# Patient Record
Sex: MALE | Race: BLACK OR AFRICAN AMERICAN | Employment: FULL TIME | URBAN - METROPOLITAN AREA
[De-identification: names, ages, dates, MRNs, and addresses within clinical notes are randomized per-mention and may not be internally consistent; named-entity substitution may affect disease eponyms.]

---

## 2020-09-03 ENCOUNTER — OFFICE VISIT (OUTPATIENT)
Dept: CARDIOLOGY CLINIC | Age: 55
End: 2020-09-03

## 2020-09-03 VITALS
HEIGHT: 74 IN | BODY MASS INDEX: 32.6 KG/M2 | SYSTOLIC BLOOD PRESSURE: 149 MMHG | DIASTOLIC BLOOD PRESSURE: 93 MMHG | WEIGHT: 254 LBS | TEMPERATURE: 97.4 F | HEART RATE: 63 BPM

## 2020-09-03 DIAGNOSIS — Z86.79 S/P ABLATION OPERATION FOR ARRHYTHMIA: ICD-10-CM

## 2020-09-03 DIAGNOSIS — Z98.890 S/P ABLATION OPERATION FOR ARRHYTHMIA: ICD-10-CM

## 2020-09-03 DIAGNOSIS — I48.3 TYPICAL ATRIAL FLUTTER (HCC): Primary | ICD-10-CM

## 2020-09-03 DIAGNOSIS — R07.9 EXERTIONAL CHEST PAIN: ICD-10-CM

## 2020-09-03 DIAGNOSIS — I10 ESSENTIAL HYPERTENSION WITH GOAL BLOOD PRESSURE LESS THAN 130/85: ICD-10-CM

## 2020-09-03 RX ORDER — DILTIAZEM HYDROCHLORIDE 120 MG/1
120 CAPSULE, COATED, EXTENDED RELEASE ORAL DAILY
Qty: 90 CAP | Refills: 2 | Status: SHIPPED | OUTPATIENT
Start: 2020-09-03 | End: 2020-09-14

## 2020-09-03 RX ORDER — AMLODIPINE BESYLATE 5 MG/1
5 TABLET ORAL DAILY
COMMUNITY
End: 2020-09-03 | Stop reason: ALTCHOICE

## 2020-09-03 NOTE — PROGRESS NOTES
Have you been diagnosed with, tested for, or told that you are suspected of having COVID-19 (coronovirus)? N  Have you had a fever or taken medication to treat a fever in the past 72 hours? N  Have you had a cough, SOB, or flu-like symptoms within the past 3 days? N  Have you had direct contact with someone who tested positive for COVID-19 within the past 14 days? N  Do you have a household member with flu-like symptoms, including fever, cough, or SOB? N  Do you currently have flu-like symptoms including fever, cough, or SOB? NHave you been diagnosed with, tested for, or told that you are suspected of having COVID-19 (coronovirus)? N  Have you had a fever or taken medication to treat a fever in the past 72 hours? N  Have you had a cough, SOB, or flu-like symptoms within the past 3 days? N  Have you had direct contact with someone who tested positive for COVID-19 within the past 14 days? N  Do you have a household member with flu-like symptoms, including fever, cough, or SOB? N  Do you currently have flu-like symptoms including fever, cough, or SOB?    Nevin Momin

## 2020-09-03 NOTE — PROGRESS NOTES
1. Have you been to the ER, urgent care clinic since your last visit? Hospitalized since your last visit? [facility]    2. Have you seen or consulted any other health care providers outside of the [address] [place] [address] since your last visit? Include any pap smears or colon screening. Yes Where: Dr. Selvin Hampton     3. Since your last visit, have you had any of the following symptoms? chest pains and palpitations. 4.  Have you had any blood work, X-rays or cardiac testing? No     5. Where do you normally have your labs drawn?   6. Do you need any refills today?    no

## 2020-09-03 NOTE — PROGRESS NOTES
HISTORY OF PRESENT ILLNESS  Patrick Harry is a 53 y.o. male. 9/2020 Patient c/o exertional chest pain with palpitations. This has been happening over the last 2-3 weeks, episodes last 5-10 seconds and resolve. He stopped taking Cardizem and Eliquis years ago. He was seen by PCP on Monday and was given Rx for Amlodipine 5 mg, but has not started it yet. He denies SOB, edema, or syncopal episodes. Irregular Heart Beat    The history is provided by the patient and medical records. This is a recurrent problem. The current episode started more than 1 week ago. The problem has not changed since onset. The problem occurs every several days. The problem is associated with exercise. Associated symptoms include chest pain. Pertinent negatives include no malaise/fatigue, no claudication, no orthopnea, no PND, no nausea, no vomiting, no dizziness, no cough and no shortness of breath. Chest Pain (Angina)    Pertinent negatives include no claudication, no cough, no dizziness, no malaise/fatigue, no nausea, no orthopnea, no palpitations, no PND, no shortness of breath and no vomiting. Review of Systems   Constitutional: Negative for malaise/fatigue. HENT: Negative for congestion. Eyes: Negative for double vision and redness. Respiratory: Negative for cough, shortness of breath and wheezing. Cardiovascular: Positive for chest pain. Negative for palpitations, orthopnea, claudication, leg swelling and PND. Gastrointestinal: Negative for nausea and vomiting. Musculoskeletal: Negative for falls. Neurological: Negative for dizziness. Endo/Heme/Allergies: Does not bruise/bleed easily.      No Known Allergies    Past Medical History:   Diagnosis Date    Atrial fibrillation ([address] 3/9/2016    Essential hypertension with goal blood pressure less than 130/85 4/21/2016    Essential hypertension with goal blood pressure less than 130/85 4/21/2016    H/O rotator cuff tear     Hernia     Obesity (BMI 30.0-34.9) 3/9/2016       Family History   Problem Relation Age of Onset    Hypertension Mother     Heart Disease Mother     Hypertension Father     Heart Attack Neg Hx     Stroke Neg Hx     Heart Surgery Neg Hx        Social History     Tobacco Use    Smoking status: Never Smoker    Smokeless tobacco: Never Used   Substance Use Topics    Alcohol use: No    Drug use: No        Current Outpatient Medications   Medication Sig    dilTIAZem ER (CARDIZEM CD) 120 mg capsule Take 1 Cap by mouth daily. No current facility-administered medications for this visit. Past Surgical History:   Procedure Laterality Date    HX HERNIA REPAIR      HX ORTHOPAEDIC      rotator cuff       Visit Vitals  BP (!) 149/93   Pulse 63   Temp 97.4 °F (36.3 °C) (Temporal)   Ht 6' 2\" (1.88 m)   Wt 115.2 kg (254 lb)   BMI 32.61 kg/m²       Diagnostic Studies:  I have reviewed the relevant tests done on the patient and show as follows  EKG tracings reviewed by me today. CARDIOLOGY STUDIES 3/17/2016 3/8/2016 3/6/2015   EKG Result - AFib, rvr, NSTT -   Stress Echo Result - - negative   Echocardiogram - Complete Result 55%EF, tr MR - -   24 hr Holter Monitor Result SR, PAF(18.7%), PACs, PVCs - -   Some recent data might be hidden       Mr. Jojo Ruiz has a reminder for a \"due or due soon\" health maintenance. I have asked that he contact his primary care provider for follow-up on this health maintenance. Physical Exam   Constitutional: He is oriented to person, place, and time. He appears well-developed and well-nourished. Neck: Normal range of motion. Neck supple. No JVD present. Cardiovascular: Normal rate, regular rhythm, normal heart sounds and intact distal pulses. Exam reveals no gallop and no friction rub. No murmur heard. Pulmonary/Chest: Effort normal and breath sounds normal. No respiratory distress. He has no wheezes. He has no rales. He exhibits no tenderness. Musculoskeletal: Normal range of motion. General: No edema. Neurological: He is alert and oriented to person, place, and time. Skin: Skin is warm and dry. Nursing note and vitals reviewed. Atrial Fibrillation CHADSVASC2 Score Stroke Risk:   53 y.o. <65        + 0    male Male     [de-identified]   CHF HX: No    + 0   HTN HX: Yes    +1   Stroke/TIA/Thromboembolism No    +0   Vascular Disease HX: No    + 0   Diabetes Mellitus No    + 0   CHADSVASC 2 Score 1      Annual Stroke Risk 0.6% - low-moderate risk        ASSESSMENT and PLAN    9/2020 - EKG SR  Patient was last seen in 2016. He has h/o PAF s/p cryo ablation. He stopped taking all medications several years ago, because he felt they were no longer needed. He c/o chest pain with palpitations that began 2-3 weeks ago. Episodes occur with exertion, last 5-10 seconds and resolve with rest. He denies SOB, edema or syncope. Will begin Diltiazem 120 mg. Advised to not start Amlodipine. Follow home b/p. Event monitor x 30 days to evaluate for PAF. Lili Orosco is 1 for HTN. Will monitor. Diagnoses and all orders for this visit:    1. Typical atrial flutter (HCC)  -     AMB POC EKG ROUTINE W/ 12 LEADS, INTER & REP  -     CARDIAC EVENT MONITOR; Future  -     dilTIAZem ER (CARDIZEM CD) 120 mg capsule; Take 1 Cap by mouth daily.  -     ECHO ADULT COMPLETE; Future    2. Exertional chest pain    3. Essential hypertension with goal blood pressure less than 130/85    4. S/P ablation operation for arrhythmia  Comments:   A.fib ablation with cryo 10/4/16        Pertinent laboratory and test data reviewed and discussed with patient.   See patient instructions also for other medical advice given    Medications Discontinued During This Encounter   Medication Reason    apixaban (ELIQUIS) 5 mg tablet Non-Compliance    diltiazem CD (CARDIZEM CD) 120 mg ER capsule Non-Compliance    pantoprazole (PROTONIX) 40 mg tablet Non-Compliance    amLODIPine (NORVASC) 5 mg tablet Discontinued by Another Clinician       Follow-up and Dispositions    · Return in about 6 weeks (around 10/15/2020). I have independently evaluated and examined the patient. Seen after almost 4 years. Will check an event monitor to evaluate atrial fibrillation. Restart Cardizem that was controlling his blood pressure in the past and follow the home chart. Check an echo. Discussed with patient and stressed the importance of blood pressure control. This will reduce the risk of cardiovascular disease as well as kidney disease in future. He seems to understand. All relevant labs and testing data are reviewed. Care plan discussed and updated after review.   Nika John MD

## 2020-09-03 NOTE — PATIENT INSTRUCTIONS
Do not start Amlodipine Begin Cardizem  mg/ day Event Monitor - f/u after finished. After the recommended changes have been made in blood pressure medicines, patient advised to keep BP/HR(pulse rate) chart twice daily and bring us results in next 1 week or so. Patient may send the results via \"My Chart\" if desired. Please rest for 5-10 minutes before checking blood pressure. Sit on a comfortable chair without crossing the legs and put your arm on a table. We recommend that you use an upper arm cuff. Check the blood pressure 3 times weekly and take the lowest reading. If you check the blood pressure in both arms, use the higher reading. Heart-Healthy Diet: Care Instructions Your Care Instructions A heart-healthy diet has lots of vegetables, fruits, nuts, beans, and whole grains, and is low in salt. It limits foods that are high in saturated fat, such as meats, cheeses, and fried foods. It may be hard to change your diet, but even small changes can lower your risk of heart attack and heart disease. Follow-up care is a key part of your treatment and safety. Be sure to make and go to all appointments, and call your doctor if you are having problems. It's also a good idea to know your test results and keep a list of the medicines you take. How can you care for yourself at home? Watch your portions · Learn what a serving is. A \"serving\" and a \"portion\" are not always the same thing. Make sure that you are not eating larger portions than are recommended. For example, a serving of pasta is ½ cup. A serving size of meat is 2 to 3 ounces. A 3-ounce serving is about the size of a deck of cards. Measure serving sizes until you are good at [place]" them. Keep in mind that restaurants often serve portions that are 2 or 3 times the size of one serving. · To keep your energy level up and keep you from feeling hungry, eat often but in smaller portions. · Eat only the number of calories you need to stay at a healthy weight. If you need to lose weight, eat fewer calories than your body burns (through exercise and other physical activity). Eat more fruits and vegetables · Eat a variety of fruit and vegetables every day. Dark green, deep orange, red, or yellow fruits and vegetables are especially good for you. Examples include spinach, carrots, peaches, and berries. · Keep carrots, celery, and other veggies handy for snacks. Buy fruit that is in season and store it where you can see it so that you will be tempted to eat it. · Cook dishes that have a lot of veggies in them, such as stir-fries and soups. Limit saturated and trans fat · Read food labels, and try to avoid saturated and trans fats. They increase your risk of heart disease. · Use olive or canola oil when you cook. · Bake, broil, grill, or steam foods instead of frying them. · Choose lean meats instead of high-fat meats such as hot dogs and sausages. Cut off all visible fat when you prepare meat. · Eat fish, skinless poultry, and meat alternatives such as soy products instead of high-fat meats. Soy products, such as tofu, may be especially good for your heart. · Choose low-fat or fat-free milk and dairy products. Eat foods high in fiber · Eat a variety of grain products every day. Include whole-grain foods that have lots of fiber and nutrients. Examples of whole-grain foods include oats, whole wheat bread, and brown rice. · Buy whole-grain breads and cereals, instead of white bread or pastries. Limit salt and sodium · Limit how much salt and sodium you eat to help lower your blood pressure. · Taste food before you salt it. Add only a little salt when you think you need it. With time, your taste buds will adjust to less salt. · Eat fewer snack items, fast foods, and other high-salt, processed foods. Check food labels for the amount of sodium in packaged foods. · Choose low-sodium versions of canned goods (such as soups, vegetables, and beans). Limit sugar · Limit drinks and foods with added sugar. These include candy, desserts, and soda pop. Limit alcohol · Limit alcohol to no more than 2 drinks a day for men and 1 drink a day for women. Too much alcohol can cause health problems. When should you call for help? Watch closely for changes in your health, and be sure to contact your doctor if: 
· You would like help planning heart-healthy meals. Where can you learn more? Go to http://www.viera.com/ Enter V137 in the search box to learn more about \"Heart-Healthy Diet: Care Instructions. \" Current as of: August 22, 2019               Content Version: 12.5 © 8761-2408 [employer]. Care instructions adapted under license by [employer] (which disclaims liability or warranty for this information). If you have questions about a medical condition or this instruction, always ask your healthcare professional. [address] any warranty or liability for your use of this information. Link Trigger Activation Thank you for requesting access to Link Trigger. Please follow the instructions below to securely access and download your online medical record. Link Trigger allows you to send messages to your doctor, view your test results, renew your prescriptions, schedule appointments, and more. How Do I Sign Up? 1. In your internet browser, go to [email or web address]. 2. Click on the First Time User? Click Here link in the Sign In box. You will see the New Member Sign Up page. 3. Enter your Link Trigger Access Code exactly as it appears below. You will not need to use this code after youve completed the sign-up process. If you do not sign up before the expiration date, you must request a new code.  
 
Link Trigger Access Code: [email or web address] 
 Expires: 10/18/2020  9:16 AM (This is the date your Altobeam access code will ) 4. Enter the last four digits of your Social Security Number (xxxx) and Date of Birth (mm/dd/yyyy) as indicated and click Submit. You will be taken to the next sign-up page. 5. Create a Altobeam ID. This will be your Altobeam login ID and cannot be changed, so think of one that is secure and easy to remember. 6. Create a Altobeam password. You can change your password at any time. 7. Enter your Password Reset Question and Answer. This can be used at a later time if you forget your password. 8. Enter your e-mail address. You will receive e-mail notification when new information is available in [address] 9. Click Sign Up. You can now view and download portions of your medical record. 10. Click the Download Summary menu link to download a portable copy of your medical information. Additional Information If you have questions, please visit the Frequently Asked Questions section of the Altobeam website at [email or web address] Remember, Altobeam is NOT to be used for urgent needs. For medical emergencies, dial 911.

## 2020-09-10 ENCOUNTER — TELEPHONE (OUTPATIENT)
Dept: CARDIOLOGY CLINIC | Age: 55
End: 2020-09-10

## 2020-09-10 NOTE — LETTER
NOTIFICATION RETURN TO WORK / SCHOOL 
 
9/10/2020 1:01 PM 
 
 
To: Kassidy Campos Reference to:  Trip Hickey , : 1965 Trip Hickey is currently under the care of [facility]. Patient has a monitor and this device comes with the monitor that looks like a foreign but it cannot be used as a cell phone. Patient needs to wear the monitor to diagnose his heart issues. If there are questions or concerns please have the patient contact our office at [phone]. Sincerely, Signed By: Marifer Roy MD  
 September 10, 2020 Dr. Marifer Iniguez.  Ely Roy MD, [facility]

## 2020-09-10 NOTE — TELEPHONE ENCOUNTER
Patient stated he is wearing a monitor and he works at [employer] and they are not allowed to have phones in the building. He stated he need a note stated he has to wear the monitor and he need the phone device.  Please advise

## 2020-09-10 NOTE — TELEPHONE ENCOUNTER
The device that comes with the monitor looks like a foreign but it cannot be used as a cell phone. Patient needs to wear the monitor to diagnose his heart issues.     Please give him the above letter

## 2020-09-14 ENCOUNTER — OFFICE VISIT (OUTPATIENT)
Dept: CARDIOLOGY CLINIC | Age: 55
End: 2020-09-14

## 2020-09-14 VITALS
DIASTOLIC BLOOD PRESSURE: 104 MMHG | HEART RATE: 74 BPM | TEMPERATURE: 97.4 F | WEIGHT: 249 LBS | SYSTOLIC BLOOD PRESSURE: 156 MMHG | BODY MASS INDEX: 31.95 KG/M2 | HEIGHT: 74 IN

## 2020-09-14 DIAGNOSIS — I10 ESSENTIAL HYPERTENSION WITH GOAL BLOOD PRESSURE LESS THAN 130/85: Primary | ICD-10-CM

## 2020-09-14 DIAGNOSIS — R42 DIZZINESS: ICD-10-CM

## 2020-09-14 DIAGNOSIS — Z86.79 S/P ABLATION OPERATION FOR ARRHYTHMIA: ICD-10-CM

## 2020-09-14 DIAGNOSIS — E66.9 OBESITY (BMI 30.0-34.9): ICD-10-CM

## 2020-09-14 DIAGNOSIS — Z98.890 S/P ABLATION OPERATION FOR ARRHYTHMIA: ICD-10-CM

## 2020-09-14 DIAGNOSIS — I48.3 TYPICAL ATRIAL FLUTTER (HCC): ICD-10-CM

## 2020-09-14 RX ORDER — LISINOPRIL 10 MG/1
10 TABLET ORAL DAILY
Qty: 30 TAB | Refills: 1 | Status: SHIPPED | OUTPATIENT
Start: 2020-09-14 | End: 2020-09-28

## 2020-09-14 RX ORDER — DILTIAZEM HYDROCHLORIDE 240 MG/1
240 CAPSULE, COATED, EXTENDED RELEASE ORAL DAILY
Qty: 90 CAP | Refills: 1 | Status: SHIPPED | OUTPATIENT
Start: 2020-09-14

## 2020-09-14 RX ORDER — MECLIZINE HYDROCHLORIDE 25 MG/1
25 TABLET ORAL
Qty: 30 TAB | Refills: 0 | Status: SHIPPED | OUTPATIENT
Start: 2020-09-14 | End: 2020-09-24

## 2020-09-14 NOTE — LETTER
NOTIFICATION OF RETURN TO WORK / SCHOOL 
 
9/14/2020 8:34 AM 
 
Mr. Liz Diehl [address] [facility] [id number] [place] To Whom It May Concern: 
 
Liz Diehl was under the care of [address] from 9/14/2020 to 9/14/2020. He will be able to return to work on 9/29/2020 with no restrictions. If there are questions or concerns please have the patient contact our office. Sincerely, Signed By: Roya Delcid MD  
 September 14, 2020 Hemant Thompson MD

## 2020-09-14 NOTE — PATIENT INSTRUCTIONS
Medications Discontinued During This Encounter Medication Reason  dilTIAZem ER (CARDIZEM CD) 120 mg capsule After the recommended changes have been made in blood pressure medicines, patient advised to keep BP/HR(pulse rate) chart twice daily and bring us results in next 2-3 days. Patient may send the results via \"My Chart\" if desired. Please rest for 5-10 minutes before checking blood pressure. Sit on a comfortable chair without crossing the legs and put your arm on a table. We recommend that you use an upper arm cuff. Check the blood pressure 3 times weekly and take the lowest reading. If you check the blood pressure in both arms, use the higher reading. Because of severely elevated blood pressure patient should be excused from work for 2 weeks till his next appointment. High Blood Pressure: Care Instructions Overview It's normal for blood pressure to go up and down throughout the day. But if it stays up, you have high blood pressure. Another name for high blood pressure is hypertension. Despite what a lot of people think, high blood pressure usually doesn't cause headaches or make you feel dizzy or lightheaded. It usually has no symptoms. But it does increase your risk of stroke, heart attack, and other problems. You and your doctor will talk about your risks of these problems based on your blood pressure. Your doctor will give you a goal for your blood pressure. Your goal will be based on your health and your age. Lifestyle changes, such as eating healthy and being active, are always important to help lower blood pressure. You might also take medicine to reach your blood pressure goal. 
Follow-up care is a key part of your treatment and safety. Be sure to make and go to all appointments, and call your doctor if you are having problems. It's also a good idea to know your test results and keep a list of the medicines you take. How can you care for yourself at home? Medical treatment · If you stop taking your medicine, your blood pressure will go back up. You may take one or more types of medicine to lower your blood pressure. Be safe with medicines. Take your medicine exactly as prescribed. Call your doctor if you think you are having a problem with your medicine. · Talk to your doctor before you start taking aspirin every day. Aspirin can help certain people lower their risk of a heart attack or stroke. But taking aspirin isn't right for everyone, because it can cause serious bleeding. · See your doctor regularly. You may need to see the doctor more often at first or until your blood pressure comes down. · If you are taking blood pressure medicine, talk to your doctor before you take decongestants or anti-inflammatory medicine, such as ibuprofen. Some of these medicines can raise blood pressure. · Learn how to check your blood pressure at home. Lifestyle changes · Stay at a healthy weight. This is especially important if you put on weight around the waist. Losing even 10 pounds can help you lower your blood pressure. · If your doctor recommends it, get more exercise. Walking is a good choice. Bit by bit, increase the amount you walk every day. Try for at least 30 minutes on most days of the week. You also may want to swim, bike, or do other activities. · Avoid or limit alcohol. Talk to your doctor about whether you can drink any alcohol. · Try to limit how much sodium you eat to less than 2,300 milligrams (mg) a day. Your doctor may ask you to try to eat less than 1,500 mg a day. · Eat plenty of fruits (such as bananas and oranges), vegetables, legumes, whole grains, and low-fat dairy products. · Lower the amount of saturated fat in your diet. Saturated fat is found in animal products such as milk, cheese, and meat. Limiting these foods may help you lose weight and also lower your risk for heart disease. · Do not smoke. Smoking increases your risk for heart attack and stroke. If you need help quitting, talk to your doctor about stop-smoking programs and medicines. These can increase your chances of quitting for good. When should you call for help? Call  911 anytime you think you may need emergency care. This may mean having symptoms that suggest that your blood pressure is causing a serious heart or blood vessel problem. Your blood pressure may be over 180/120. For example, call 911 if: 
  · You have symptoms of a heart attack. These may include: 
? Chest pain or pressure, or a strange feeling in the chest. 
? Sweating. ? Shortness of breath. ? Nausea or vomiting. ? Pain, pressure, or a strange feeling in the back, neck, jaw, or upper belly or in one or both shoulders or arms. ? Lightheadedness or sudden weakness. ? A fast or irregular heartbeat.  
  · You have symptoms of a stroke. These may include: 
? Sudden numbness, tingling, weakness, or loss of movement in your face, arm, or leg, especially on only one side of your body. ? Sudden vision changes. ? Sudden trouble speaking. ? Sudden confusion or trouble understanding simple statements. ? Sudden problems with walking or balance. ? A sudden, severe headache that is different from past headaches.  
  · You have severe back or belly pain. Do not wait until your blood pressure comes down on its own. Get help right away. Call your doctor now or seek immediate care if: 
  · Your blood pressure is much higher than normal (such as 180/120 or higher), but you don't have symptoms.  
  · You think high blood pressure is causing symptoms, such as: 
? Severe headache. 
? Blurry vision. Watch closely for changes in your health, and be sure to contact your doctor if: 
  · Your blood pressure measures higher than your doctor recommends at least 2 times. That means the top number is higher or the bottom number is higher, or both.  
  · You think you may be having side effects from your blood pressure medicine. Where can you learn more? Go to http://www.gray.com/ Enter D856 in the search box to learn more about \"High Blood Pressure: Care Instructions. \" Current as of: 2019               Content Version: 12.6 © 5681-7163 Healthwise, Incorporated. Care instructions adapted under license by [employer] (which disclaims liability or warranty for this information). If you have questions about a medical condition or this instruction, always ask your healthcare professional. [facility] [address] any warranty or liability for your use of this information. Brandlive Activation Thank you for requesting access to Brandlive. Please follow the instructions below to securely access and download your online medical record. Brandlive allows you to send messages to your doctor, view your test results, renew your prescriptions, schedule appointments, and more. How Do I Sign Up? 1. In your internet browser, go to [email or web address] [email or web address]. 2. Click on the First Time User? Click Here link in the Sign In box. You will see the New Member Sign Up page. 3. Enter your Brandlive Access Code exactly as it appears below. You will not need to use this code after youve completed the sign-up process. If you do not sign up before the expiration date, you must request a new code. Brandlive Access Code: [email or web address] Expires: 10/29/2020  8:04 AM (This is the date your Brandlive access code will ) 4. Enter the last four digits of your Social Security Number (xxxx) and Date of Birth (mm/dd/yyyy) as indicated and click Submit. You will be taken to the next sign-up page. 5. Create a Brandlive ID. This will be your Brandlive login ID and cannot be changed, so think of one that is secure and easy to remember. 6. Create a Brandlive password. You can change your password at any time. 7. Enter your Password Reset Question and Answer.  This can be used at a later time if you forget your password. 8. Enter your e-mail address. You will receive e-mail notification when new information is available in [address] 9. Click Sign Up. You can now view and download portions of your medical record. 10. Click the Download Summary menu link to download a portable copy of your medical information. Additional Information If you have questions, please visit the Frequently Asked Questions section of the ZenMate website at [email or web address] Remember, [employer]Mate is NOT to be used for urgent needs. For medical emergencies, dial 911.

## 2020-09-14 NOTE — PROGRESS NOTES
HISTORY OF PRESENT ILLNESS  Maxwell Montiel is a 53 y.o. male. 9/2020 Patient c/o exertional chest pain with palpitations. This has been happening over the last 2-3 weeks, episodes last 5-10 seconds and resolve. He stopped taking Cardizem and Eliquis years ago. He was seen by PCP on Monday and was given Rx for Amlodipine 5 mg, but has not started it yet. He denies SOB, edema, or syncopal episodes. Palpitations    The history is provided by the patient and medical records. This is a recurrent problem. The current episode started more than 1 week ago. The problem has not changed since onset. The problem occurs every several days. The problem is associated with exercise. Associated symptoms include chest pain, dizziness and shortness of breath. Pertinent negatives include no fever, no malaise/fatigue, no claudication, no orthopnea, no PND, no nausea, no vomiting, no headaches and no cough. Chest Pain (Angina)    The history is provided by the patient. This is a new problem. The current episode started more than 2 days ago. The problem occurs daily (4-5/ay). The pain is associated with rest and normal activity. The pain is present in the lateral region and right side. The quality of the pain is described as stabbing. Associated symptoms include dizziness, palpitations and shortness of breath. Pertinent negatives include no claudication, no cough, no fever, no headaches, no malaise/fatigue, no nausea, no orthopnea, no PND and no vomiting. He has tried nothing for the symptoms. Dizziness   The history is provided by the patient. This is a new problem. The current episode started more than 1 week ago. The problem occurs daily. Associated symptoms include chest pain and shortness of breath. Pertinent negatives include no headaches. The symptoms are aggravated by standing and twisting. The symptoms are relieved by sleep.        Review of Systems   Constitutional: Negative for chills, fever, malaise/fatigue and weight loss. HENT: Negative for congestion and nosebleeds. Eyes: Negative for double vision, discharge and redness. Respiratory: Positive for shortness of breath. Negative for cough and wheezing. Cardiovascular: Positive for chest pain and palpitations. Negative for orthopnea, claudication, leg swelling and PND. Gastrointestinal: Negative for diarrhea, nausea and vomiting. Genitourinary: Negative for dysuria and hematuria. Musculoskeletal: Negative for falls and joint pain. Skin: Negative for rash. Neurological: Positive for dizziness. Negative for seizures, loss of consciousness and headaches. Endo/Heme/Allergies: Negative for polydipsia. Does not bruise/bleed easily. Psychiatric/Behavioral: Negative for depression and substance abuse. The patient does not have insomnia. No Known Allergies    Past Medical History:   Diagnosis Date    Atrial fibrillation ([facility] [address] 3/9/2016    Essential hypertension with goal blood pressure less than 130/85 4/21/2016    Essential hypertension with goal blood pressure less than 130/85 4/21/2016    H/O rotator cuff tear     Hernia     Obesity (BMI 30.0-34.9) 3/9/2016       Family History   Problem Relation Age of Onset    Hypertension Mother     Heart Disease Mother     Hypertension Father     Heart Attack Neg Hx     Stroke Neg Hx     Heart Surgery Neg Hx        Social History     Tobacco Use    Smoking status: Never Smoker    Smokeless tobacco: Never Used   Substance Use Topics    Alcohol use: No    Drug use: No        Current Outpatient Medications   Medication Sig    dilTIAZem ER (CARDIZEM CD) 120 mg capsule Take 1 Cap by mouth daily. No current facility-administered medications for this visit.          Past Surgical History:   Procedure Laterality Date    HX HERNIA REPAIR      HX ORTHOPAEDIC      rotator cuff       Visit Vitals  BP (!) 156/104 (BP 1 Location: Left arm, BP Patient Position: Standing)   Pulse 74   Temp 97.4 °F (36.3 °C) (Temporal)   Ht 6' 2\" (1.88 m)   Wt 112.9 kg (249 lb)   BMI 31.97 kg/m²     Vitals:    09/14/20 0807 09/14/20 0812 09/14/20 0813   BP: (!) 166/91 (!) 168/89 (!) 156/104   BP 1 Location: Left arm Left arm Left arm   BP Patient Position: Sitting Supine Standing   Pulse: 66 68 74   Temp: 97.4 °F (36.3 °C)     TempSrc: Temporal     Weight: 112.9 kg (249 lb)     Height: 6' 2\" (1.88 m)           Diagnostic Studies:  I have reviewed the relevant tests done on the patient and show as follows  EKG tracings reviewed by me today. CARDIOLOGY STUDIES 3/17/2016 3/8/2016 3/6/2015   EKG Result - AFib, rvr, NSTT -   Stress Echo Result - - negative   Echocardiogram - Complete Result 55%EF, tr MR - -   24 hr Holter Monitor Result SR, PAF(18.7%), PACs, PVCs - -   Some recent data might be hidden       Mr. Lyssa Flores has a reminder for a \"due or due soon\" health maintenance. I have asked that he contact his primary care provider for follow-up on this health maintenance. Physical Exam   Constitutional: He is oriented to person, place, and time. He appears well-developed and well-nourished. Neck: Normal range of motion. Neck supple. No JVD present. Cardiovascular: Normal rate, regular rhythm, normal heart sounds and intact distal pulses. Exam reveals no gallop and no friction rub. No murmur heard. Pulmonary/Chest: Effort normal and breath sounds normal. No respiratory distress. He has no wheezes. He has no rales. He exhibits no tenderness. Musculoskeletal: Normal range of motion. General: No edema. Neurological: He is alert and oriented to person, place, and time. Skin: Skin is warm and dry. Nursing note and vitals reviewed.     Atrial Fibrillation CHADSVASC2 Score Stroke Risk:   53 y.o. <65        + 0    male Male     [de-identified]   CHF HX: No    + 0   HTN HX: Yes    +1   Stroke/TIA/Thromboembolism No    +0   Vascular Disease HX: No    + 0   Diabetes Mellitus No    + 0   CHADSVASC 2 Score 1      Annual Stroke Risk 0.6% - low-moderate risk        ASSESSMENT and PLAN    9/2020 - EKG SR  Patient was last seen in 2016. He has h/o PAF s/p cryo ablation. He stopped taking all medications several years ago, because he felt they were no longer needed. He c/o chest pain with palpitations that began 2-3 weeks ago. Episodes occur with exertion, last 5-10 seconds and resolve with rest. He denies SOB, edema or syncope. Will begin Diltiazem 120 mg. Advised to not start Amlodipine. Follow home b/p. Event monitor x 30 days to evaluate for PAF. Michael Vivass is 1 for HTN. Will monitor. 9/20 seen again due to dizziness. Also complaining of chest discomfort and shortness of breath. Symptoms are very atypical and likely secondary to uncontrolled hypertension. No orthostatic changes. Increase diltiazem and add lisinopril. Follow home chart. Will consider a stress test as blood pressure is controlled. No orthostatic changes and event monitor has not shown any significant arrhythmias so far. Excuse from work was given for 2 weeks due to uncontrolled hypertension. Diagnoses and all orders for this visit:    1. Essential hypertension with goal blood pressure less than 130/85  -     lisinopriL (PRINIVIL, ZESTRIL) 10 mg tablet; Take 1 Tab by mouth daily.  -     METABOLIC PANEL, BASIC; Future    2. Typical atrial flutter (HCC)  -     dilTIAZem ER (CARDIZEM CD) 240 mg capsule; Take 1 Cap by mouth daily. 3. S/P ablation operation for arrhythmia    4. Obesity (BMI 30.0-34.9)    5. Dizziness  -     meclizine (ANTIVERT) 25 mg tablet; Take 1 Tab by mouth two (2) times daily as needed for Dizziness for up to 10 days. Pertinent laboratory and test data reviewed and discussed with patient.   See patient instructions also for other medical advice given    Medications Discontinued During This Encounter   Medication Reason    dilTIAZem ER (CARDIZEM CD) 120 mg capsule        Follow-up and Dispositions    · Return in about 2 weeks (around 9/28/2020), or if symptoms worsen or fail to improve, for post test.

## 2020-09-14 NOTE — PROGRESS NOTES
1. Have you been to the ER, urgent care clinic since your last visit? Hospitalized since your last visit?     no  2. Have you seen or consulted any other health care providers outside of the [address] [place] [address] since your last visit? Include any pap smears or colon screening. No     3. Since your last visit, have you had any of the following symptoms? chest pains, shortness of breath and dizziness. 4.  Have you had any blood work, X-rays or cardiac testing? No         5. Where do you normally have your labs drawn? 6. Do you need any refills today?    no

## 2020-09-16 ENCOUNTER — TELEPHONE (OUTPATIENT)
Dept: CARDIOLOGY CLINIC | Age: 55
End: 2020-09-16

## 2020-09-16 DIAGNOSIS — I48.3 TYPICAL ATRIAL FLUTTER (HCC): ICD-10-CM

## 2020-09-16 NOTE — TELEPHONE ENCOUNTER
Per Dr Hay Grimes after reviewing Event done on 9/9/20:    Buck Graham states that patient fainted  SR w/ PAC's/ PVC's  Call and check on patient. Left message for patient to call the office.

## 2020-09-16 NOTE — TELEPHONE ENCOUNTER
Called and spoke with patient. Patient states that he didn't faint that he felt a little dizzy after standing and was a little off balance.  CARL

## 2020-09-26 LAB
ANION GAP SERPL CALC-SCNC: 12.3 MMOL/L (ref 3–15)
BUN SERPL-MCNC: 11 MG/DL (ref 6–22)
CALCIUM SERPL-MCNC: 9 MG/DL (ref 8.4–10.5)
CHLORIDE SERPL-SCNC: 105 MMOL/L (ref 98–110)
CO2 SERPL-SCNC: 28 MMOL/L (ref 20–32)
CREAT SERPL-MCNC: 0.9 MG/DL (ref 0.5–1.2)
GFRAA, 66117: >60
GFRNA, 66118: >60
GLUCOSE SERPL-MCNC: 96 MG/DL (ref 70–99)
POTASSIUM SERPL-SCNC: 4.5 MMOL/L (ref 3.5–5.5)
SODIUM SERPL-SCNC: 145 MMOL/L (ref 133–145)

## 2020-09-28 ENCOUNTER — OFFICE VISIT (OUTPATIENT)
Dept: CARDIOLOGY CLINIC | Age: 55
End: 2020-09-28
Payer: COMMERCIAL

## 2020-09-28 VITALS
WEIGHT: 252 LBS | HEART RATE: 63 BPM | SYSTOLIC BLOOD PRESSURE: 168 MMHG | BODY MASS INDEX: 32.34 KG/M2 | HEIGHT: 74 IN | TEMPERATURE: 97.7 F | DIASTOLIC BLOOD PRESSURE: 86 MMHG

## 2020-09-28 DIAGNOSIS — E66.9 OBESITY (BMI 30.0-34.9): ICD-10-CM

## 2020-09-28 DIAGNOSIS — Z98.890 S/P ABLATION OPERATION FOR ARRHYTHMIA: ICD-10-CM

## 2020-09-28 DIAGNOSIS — R42 DIZZINESS: ICD-10-CM

## 2020-09-28 DIAGNOSIS — Z86.79 S/P ABLATION OPERATION FOR ARRHYTHMIA: ICD-10-CM

## 2020-09-28 DIAGNOSIS — I10 ESSENTIAL HYPERTENSION WITH GOAL BLOOD PRESSURE LESS THAN 130/85: Primary | ICD-10-CM

## 2020-09-28 PROCEDURE — 99213 OFFICE O/P EST LOW 20 MIN: CPT | Performed by: INTERNAL MEDICINE

## 2020-09-28 RX ORDER — LISINOPRIL 20 MG/1
20 TABLET ORAL DAILY
Qty: 90 TAB | Refills: 1 | Status: SHIPPED | OUTPATIENT
Start: 2020-09-28

## 2020-09-28 NOTE — PATIENT INSTRUCTIONS
Medications Discontinued During This Encounter Medication Reason  lisinopriL (PRINIVIL, ZESTRIL) 10 mg tablet After the recommended changes have been made in blood pressure medicines, patient advised to keep BP/HR(pulse rate) chart twice daily and bring us results in next 3 days. Patient may send the results via \"My Chart\" if desired. Please rest for 5-10 minutes before checking blood pressure. Sit on a comfortable chair without crossing the legs and put your arm on a table. We recommend that you use an upper arm cuff. Check the blood pressure 3 times each time you check the blood pressure and record the lowest reading. If you check the blood pressure in both arms, use the higher reading. High Blood Pressure: Care Instructions Overview It's normal for blood pressure to go up and down throughout the day. But if it stays up, you have high blood pressure. Another name for high blood pressure is hypertension. Despite what a lot of people think, high blood pressure usually doesn't cause headaches or make you feel dizzy or lightheaded. It usually has no symptoms. But it does increase your risk of stroke, heart attack, and other problems. You and your doctor will talk about your risks of these problems based on your blood pressure. Your doctor will give you a goal for your blood pressure. Your goal will be based on your health and your age. Lifestyle changes, such as eating healthy and being active, are always important to help lower blood pressure. You might also take medicine to reach your blood pressure goal. 
Follow-up care is a key part of your treatment and safety. Be sure to make and go to all appointments, and call your doctor if you are having problems. It's also a good idea to know your test results and keep a list of the medicines you take. How can you care for yourself at home? Medical treatment · If you stop taking your medicine, your blood pressure will go back up. You may take one or more types of medicine to lower your blood pressure. Be safe with medicines. Take your medicine exactly as prescribed. Call your doctor if you think you are having a problem with your medicine. · Talk to your doctor before you start taking aspirin every day. Aspirin can help certain people lower their risk of a heart attack or stroke. But taking aspirin isn't right for everyone, because it can cause serious bleeding. · See your doctor regularly. You may need to see the doctor more often at first or until your blood pressure comes down. · If you are taking blood pressure medicine, talk to your doctor before you take decongestants or anti-inflammatory medicine, such as ibuprofen. Some of these medicines can raise blood pressure. · Learn how to check your blood pressure at home. Lifestyle changes · Stay at a healthy weight. This is especially important if you put on weight around the waist. Losing even 10 pounds can help you lower your blood pressure. · If your doctor recommends it, get more exercise. Walking is a good choice. Bit by bit, increase the amount you walk every day. Try for at least 30 minutes on most days of the week. You also may want to swim, bike, or do other activities. · Avoid or limit alcohol. Talk to your doctor about whether you can drink any alcohol. · Try to limit how much sodium you eat to less than 2,300 milligrams (mg) a day. Your doctor may ask you to try to eat less than 1,500 mg a day. · Eat plenty of fruits (such as bananas and oranges), vegetables, legumes, whole grains, and low-fat dairy products. · Lower the amount of saturated fat in your diet. Saturated fat is found in animal products such as milk, cheese, and meat. Limiting these foods may help you lose weight and also lower your risk for heart disease. · Do not smoke. Smoking increases your risk for heart attack and stroke.  If you need help quitting, talk to your doctor about stop-smoking programs and medicines. These can increase your chances of quitting for good. When should you call for help? Call  911 anytime you think you may need emergency care. This may mean having symptoms that suggest that your blood pressure is causing a serious heart or blood vessel problem. Your blood pressure may be over 180/120. For example, call 911 if: 
  · You have symptoms of a heart attack. These may include: 
? Chest pain or pressure, or a strange feeling in the chest. 
? Sweating. ? Shortness of breath. ? Nausea or vomiting. ? Pain, pressure, or a strange feeling in the back, neck, jaw, or upper belly or in one or both shoulders or arms. ? Lightheadedness or sudden weakness. ? A fast or irregular heartbeat.  
  · You have symptoms of a stroke. These may include: 
? Sudden numbness, tingling, weakness, or loss of movement in your face, arm, or leg, especially on only one side of your body. ? Sudden vision changes. ? Sudden trouble speaking. ? Sudden confusion or trouble understanding simple statements. ? Sudden problems with walking or balance. ? A sudden, severe headache that is different from past headaches.  
  · You have severe back or belly pain. Do not wait until your blood pressure comes down on its own. Get help right away. Call your doctor now or seek immediate care if: 
  · Your blood pressure is much higher than normal (such as 180/120 or higher), but you don't have symptoms.  
  · You think high blood pressure is causing symptoms, such as: 
? Severe headache. 
? Blurry vision. Watch closely for changes in your health, and be sure to contact your doctor if: 
  · Your blood pressure measures higher than your doctor recommends at least 2 times. That means the top number is higher or the bottom number is higher, or both.  
  · You think you may be having side effects from your blood pressure medicine. Where can you learn more? Go to http://www.Flowgram.com/ Enter C015 in the search box to learn more about \"High Blood Pressure: Care Instructions. \" Current as of: December 16, 2019               Content Version: 12.6 © 0643-6832 [employer], [employer]. Care instructions adapted under license by [employer] (which disclaims liability or warranty for this information). If you have questions about a medical condition or this instruction, always ask your healthcare professional. [place] [address] any warranty or liability for your use of this information.

## 2020-09-28 NOTE — PROGRESS NOTES
HISTORY OF PRESENT ILLNESS  Katie Chahal is a 53 y.o. male. 9/2020 Patient c/o exertional chest pain with palpitations. This has been happening over the last 2-3 weeks, episodes last 5-10 seconds and resolve. He stopped taking Cardizem and Eliquis years ago. He was seen by PCP on Monday and was given Rx for Amlodipine 5 mg, but has not started it yet. He denies SOB, edema, or syncopal episodes. Palpitations    The history is provided by the patient and medical records. This is a recurrent problem. The current episode started more than 1 week ago. The problem has not changed since onset. The problem occurs every several days. The problem is associated with exercise. Associated symptoms include chest pain, dizziness and shortness of breath. Pertinent negatives include no fever, no malaise/fatigue, no claudication, no orthopnea, no PND, no nausea, no vomiting, no headaches and no cough. His past medical history is significant for hypertension. Chest Pain (Angina)    The history is provided by the patient. This is a new problem. The current episode started more than 2 days ago. The problem has been rapidly improving. The problem occurs daily (4-5/ay). The pain is associated with rest and normal activity. The pain is present in the lateral region and right side. The quality of the pain is described as stabbing. Associated symptoms include dizziness, palpitations and shortness of breath. Pertinent negatives include no claudication, no cough, no fever, no headaches, no malaise/fatigue, no nausea, no orthopnea, no PND and no vomiting. He has tried nothing for the symptoms. Dizziness   The history is provided by the patient. This is a new problem. The current episode started more than 1 week ago. The problem occurs daily (2/day for 10-15 secs). Associated symptoms include chest pain and shortness of breath. Pertinent negatives include no headaches. The symptoms are aggravated by standing and twisting.  The symptoms are relieved by sleep. Hypertension   The history is provided by the medical records. This is a chronic problem. Associated symptoms include chest pain and shortness of breath. Pertinent negatives include no headaches. Review of Systems   Constitutional: Negative for chills, fever, malaise/fatigue and weight loss. HENT: Negative for congestion and nosebleeds. Eyes: Negative for double vision, discharge and redness. Respiratory: Positive for shortness of breath. Negative for cough and wheezing. Cardiovascular: Positive for chest pain and palpitations. Negative for orthopnea, claudication, leg swelling and PND. Gastrointestinal: Negative for diarrhea, nausea and vomiting. Genitourinary: Negative for dysuria and hematuria. Musculoskeletal: Negative for falls and joint pain. Skin: Negative for rash. Neurological: Positive for dizziness. Negative for seizures, loss of consciousness and headaches. Endo/Heme/Allergies: Negative for polydipsia. Does not bruise/bleed easily. Psychiatric/Behavioral: Negative for depression and substance abuse. The patient does not have insomnia.       No Known Allergies    Past Medical History:   Diagnosis Date    Atrial fibrillation ([facility] [address] 3/9/2016    Essential hypertension with goal blood pressure less than 130/85 4/21/2016    Essential hypertension with goal blood pressure less than 130/85 4/21/2016    H/O rotator cuff tear     Hernia     Obesity (BMI 30.0-34.9) 3/9/2016       Family History   Problem Relation Age of Onset    Hypertension Mother     Heart Disease Mother     Hypertension Father     Heart Attack Neg Hx     Stroke Neg Hx     Heart Surgery Neg Hx        Social History     Tobacco Use    Smoking status: Never Smoker    Smokeless tobacco: Never Used   Substance Use Topics    Alcohol use: No    Drug use: No        Current Outpatient Medications   Medication Sig    dilTIAZem ER (CARDIZEM CD) 240 mg capsule Take 1 Cap by mouth daily.    lisinopriL (PRINIVIL, ZESTRIL) 10 mg tablet Take 1 Tab by mouth daily. No current facility-administered medications for this visit. Past Surgical History:   Procedure Laterality Date    HX HERNIA REPAIR      HX ORTHOPAEDIC      rotator cuff       Visit Vitals  BP (!) 168/86   Pulse 63   Temp 97.7 °F (36.5 °C) (Temporal)   Ht 6' 2\" (1.88 m)   Wt 114.3 kg (252 lb)   BMI 32.35 kg/m²     Vitals:    09/28/20 0803   BP: (!) 168/86   Pulse: 63   Temp: 97.7 °F (36.5 °C)   TempSrc: Temporal   Weight: 114.3 kg (252 lb)   Height: 6' 2\" (1.88 m)         Diagnostic Studies:  I have reviewed the relevant tests done on the patient and show as follows  EKG tracings reviewed by me today. CARDIOLOGY STUDIES 3/17/2016 3/8/2016 3/6/2015   EKG Result - AFib, rvr, NSTT -   Stress Echo Result - - negative   Echocardiogram - Complete Result 55%EF, tr MR - -   24 hr Holter Monitor Result SR, PAF(18.7%), PACs, PVCs - -   Some recent data might be hidden       Mr. Louie Casas has a reminder for a \"due or due soon\" health maintenance. I have asked that he contact his primary care provider for follow-up on this health maintenance. Physical Exam   Constitutional: He is oriented to person, place, and time. He appears well-developed and well-nourished. Neck: Normal range of motion. Neck supple. No JVD present. Cardiovascular: Normal rate, regular rhythm, normal heart sounds and intact distal pulses. Exam reveals no gallop and no friction rub. No murmur heard. Pulmonary/Chest: Effort normal and breath sounds normal. No respiratory distress. He has no wheezes. He has no rales. He exhibits no tenderness. Musculoskeletal: Normal range of motion. General: No edema. Neurological: He is alert and oriented to person, place, and time. Skin: Skin is warm and dry. Nursing note and vitals reviewed.     Atrial Fibrillation CHADSVASC2 Score Stroke Risk:   53 y.o. <65        + 0    male Male     +0   CHF HX: No    + 0   HTN HX: Yes    +1   Stroke/TIA/Thromboembolism No    +0   Vascular Disease HX: No    + 0   Diabetes Mellitus No    + 0   CHADSVASC 2 Score 1      Annual Stroke Risk 0.6% - low-moderate risk        ASSESSMENT and PLAN    9/2020 - EKG SR  Patient was last seen in 2016. He has h/o PAF s/p cryo ablation. He stopped taking all medications several years ago, because he felt they were no longer needed. He c/o chest pain with palpitations that began 2-3 weeks ago. Episodes occur with exertion, last 5-10 seconds and resolve with rest. He denies SOB, edema or syncope. Will begin Diltiazem 120 mg. Advised to not start Amlodipine. Follow home b/p. Event monitor x 30 days to evaluate for PAF. Sav Barboza is 1 for HTN. Will monitor. 9/20 seen again due to dizziness. Also complaining of chest discomfort and shortness of breath. Symptoms are very atypical and likely secondary to uncontrolled hypertension. No orthostatic changes. Increase diltiazem and add lisinopril. Follow home chart. Will consider a stress test as blood pressure is controlled. No orthostatic changes and event monitor has not shown any significant arrhythmias so far. Excuse from work was given for 2 weeks due to uncontrolled hypertension. 9/20 overall feeling some better. Blood pressure is reducing slowly. Increase lisinopril to better control the blood pressure. Follow home chart. Okay to join work for now. Getting mobile telemetry which will be followed next visit. Diet weight and exercise discussed. Diagnoses and all orders for this visit:    1. Essential hypertension with goal blood pressure less than 130/85  -     lisinopriL (PRINIVIL, ZESTRIL) 20 mg tablet; Take 1 Tab by mouth daily. 2. S/P ablation operation for arrhythmia    3. Obesity (BMI 30.0-34.9)    4. Dizziness        Pertinent laboratory and test data reviewed and discussed with patient.   See patient instructions also for other medical advice given    Medications Discontinued During This Encounter   Medication Reason    lisinopriL (PRINIVIL, ZESTRIL) 10 mg tablet        Follow-up and Dispositions    · Return in about 1 month (around 10/28/2020), or if symptoms worsen or fail to improve.

## 2020-09-28 NOTE — PROGRESS NOTES
1. Have you been to the ER, urgent care clinic since your last visit? Hospitalized since your last visit?     no    2. Have you seen or consulted any other health care providers outside of the [address] [place] [address] since your last visit? Include any pap smears or colon screening. No     3. Since your last visit, have you had any of the following symptoms? chest pains and dizziness. 4.  Have you had any blood work, X-rays or cardiac testing? Yes Where: [facility]          5. Where do you normally have your labs drawn? [facility]    6. Do you need any refills today?    no

## 2023-01-04 ENCOUNTER — APPOINTMENT (OUTPATIENT)
Dept: GENERAL RADIOLOGY | Age: 58
End: 2023-01-04
Attending: STUDENT IN AN ORGANIZED HEALTH CARE EDUCATION/TRAINING PROGRAM
Payer: COMMERCIAL

## 2023-01-04 ENCOUNTER — HOSPITAL ENCOUNTER (EMERGENCY)
Age: 58
Discharge: HOME | End: 2023-01-04
Attending: STUDENT IN AN ORGANIZED HEALTH CARE EDUCATION/TRAINING PROGRAM
Payer: COMMERCIAL

## 2023-01-04 VITALS
HEART RATE: 59 BPM | HEIGHT: 74 IN | OXYGEN SATURATION: 99 % | DIASTOLIC BLOOD PRESSURE: 79 MMHG | TEMPERATURE: 98.3 F | WEIGHT: 220 LBS | RESPIRATION RATE: 16 BRPM | SYSTOLIC BLOOD PRESSURE: 149 MMHG | BODY MASS INDEX: 28.23 KG/M2

## 2023-01-04 DIAGNOSIS — R07.9 CHEST PAIN, UNSPECIFIED TYPE: Primary | ICD-10-CM

## 2023-01-04 LAB
ANION GAP SERPL CALC-SCNC: 5 MMOL/L (ref 3–18)
ATRIAL RATE: 69 BPM
BASOPHILS # BLD: 0.1 K/UL (ref 0–0.1)
BASOPHILS NFR BLD: 1 % (ref 0–2)
BUN SERPL-MCNC: 10 MG/DL (ref 7–18)
BUN/CREAT SERPL: 13 (ref 12–20)
CALCIUM SERPL-MCNC: 8.4 MG/DL (ref 8.5–10.1)
CALCULATED P AXIS, ECG09: 32 DEGREES
CALCULATED R AXIS, ECG10: -32 DEGREES
CALCULATED T AXIS, ECG11: 26 DEGREES
CHLORIDE SERPL-SCNC: 109 MMOL/L (ref 100–111)
CO2 SERPL-SCNC: 30 MMOL/L (ref 21–32)
CREAT SERPL-MCNC: 0.77 MG/DL (ref 0.6–1.3)
DIAGNOSIS, 93000: NORMAL
DIFFERENTIAL METHOD BLD: ABNORMAL
EOSINOPHIL # BLD: 0.1 K/UL (ref 0–0.4)
EOSINOPHIL NFR BLD: 2 % (ref 0–5)
ERYTHROCYTE [DISTWIDTH] IN BLOOD BY AUTOMATED COUNT: 13.3 % (ref 11.6–14.5)
GLUCOSE SERPL-MCNC: 97 MG/DL (ref 74–99)
HCT VFR BLD AUTO: 39.2 % (ref 36–48)
HGB BLD-MCNC: 13.1 G/DL (ref 13–16)
IMM GRANULOCYTES # BLD AUTO: 0 K/UL (ref 0–0.04)
IMM GRANULOCYTES NFR BLD AUTO: 1 % (ref 0–0.5)
LYMPHOCYTES # BLD: 2.5 K/UL (ref 0.9–3.6)
LYMPHOCYTES NFR BLD: 37 % (ref 21–52)
MCH RBC QN AUTO: 28.1 PG (ref 24–34)
MCHC RBC AUTO-ENTMCNC: 33.4 G/DL (ref 31–37)
MCV RBC AUTO: 84.1 FL (ref 78–100)
MONOCYTES # BLD: 0.9 K/UL (ref 0.05–1.2)
MONOCYTES NFR BLD: 13 % (ref 3–10)
NEUTS SEG # BLD: 3.2 K/UL (ref 1.8–8)
NEUTS SEG NFR BLD: 47 % (ref 40–73)
NRBC # BLD: 0 K/UL (ref 0–0.01)
NRBC BLD-RTO: 0 PER 100 WBC
P-R INTERVAL, ECG05: 148 MS
PLATELET # BLD AUTO: 332 K/UL (ref 135–420)
PMV BLD AUTO: 9.7 FL (ref 9.2–11.8)
POTASSIUM SERPL-SCNC: 3.5 MMOL/L (ref 3.5–5.5)
Q-T INTERVAL, ECG07: 418 MS
QRS DURATION, ECG06: 88 MS
QTC CALCULATION (BEZET), ECG08: 447 MS
RBC # BLD AUTO: 4.66 M/UL (ref 4.35–5.65)
SODIUM SERPL-SCNC: 144 MMOL/L (ref 136–145)
TROPONIN-HIGH SENSITIVITY: 9 NG/L (ref 0–78)
VENTRICULAR RATE, ECG03: 69 BPM
WBC # BLD AUTO: 6.8 K/UL (ref 4.6–13.2)

## 2023-01-04 PROCEDURE — 80048 BASIC METABOLIC PNL TOTAL CA: CPT

## 2023-01-04 PROCEDURE — 93005 ELECTROCARDIOGRAM TRACING: CPT

## 2023-01-04 PROCEDURE — 74011250637 HC RX REV CODE- 250/637: Performed by: STUDENT IN AN ORGANIZED HEALTH CARE EDUCATION/TRAINING PROGRAM

## 2023-01-04 PROCEDURE — 71045 X-RAY EXAM CHEST 1 VIEW: CPT

## 2023-01-04 PROCEDURE — 84484 ASSAY OF TROPONIN QUANT: CPT

## 2023-01-04 PROCEDURE — 99285 EMERGENCY DEPT VISIT HI MDM: CPT

## 2023-01-04 PROCEDURE — 85025 COMPLETE CBC W/AUTO DIFF WBC: CPT

## 2023-01-04 RX ORDER — NITROGLYCERIN 0.4 MG/1
0.4 TABLET SUBLINGUAL
Status: COMPLETED | OUTPATIENT
Start: 2023-01-04 | End: 2023-01-04

## 2023-01-04 RX ORDER — ASPIRIN 325 MG
325 TABLET ORAL
Status: COMPLETED | OUTPATIENT
Start: 2023-01-04 | End: 2023-01-04

## 2023-01-04 RX ADMIN — ASPIRIN 325 MG ORAL TABLET 325 MG: 325 PILL ORAL at 02:08

## 2023-01-04 RX ADMIN — Medication 0.4 MG: at 02:08

## 2023-01-04 NOTE — ED TRIAGE NOTES
Patient arrived to ER with complaints of chest pain for 2 weeks, aching to his left arm when he blows out his breath.   C/o shortness of breath when he walks up stairs

## 2023-01-04 NOTE — Clinical Note
[address] [place] [address] [facility]  [id number] [address] [phone]    Work/School Note    Date: 1/4/2023    To Whom It May concern:      Miguel Barba was seen and treated today in the emergency room by the following provider(s):  No providers found. Miguel Barba is excused from work/school on 01/04/23. He is clear to return to work/school on 01/05/23.         Sincerely,          Yael Armstrong, DO

## 2023-01-04 NOTE — ED PROVIDER NOTES
EMERGENCY DEPARTMENT HISTORY AND PHYSICAL EXAM    I have evaluated the patient at 2:36 AM      Date: 1/4/2023  Patient Name: Samantha Meadows    History of Presenting Illness     Chief Complaint   Patient presents with    Chest Pain         History Provided By: Patient  Location/Duration/Severity/Modifying factors   70-year-old male presenting to the emergency department with complaints of chest pain. States has been ongoing for about 2 weeks. Describes as an aching pain with radiation to his left upper extremity. No exacerbating or relieving factors. No cardiac history or pulmonary history. PCP: Radha Barth MD    Current Outpatient Medications   Medication Sig Dispense Refill    lisinopriL (PRINIVIL, ZESTRIL) 20 mg tablet Take 1 Tab by mouth daily. 90 Tab 1    dilTIAZem ER (CARDIZEM CD) 240 mg capsule Take 1 Cap by mouth daily. 80 Cap 1       Past History     Past Medical History:  Past Medical History:   Diagnosis Date    Atrial fibrillation ([facility] [address] 3/9/2016    Essential hypertension with goal blood pressure less than 130/85 4/21/2016    Essential hypertension with goal blood pressure less than 130/85 4/21/2016    H/O rotator cuff tear     Hernia     Obesity (BMI 30.0-34.9) 3/9/2016       Past Surgical History:  Past Surgical History:   Procedure Laterality Date    HX HERNIA REPAIR      HX ORTHOPAEDIC      rotator cuff       Family History:  Family History   Problem Relation Age of Onset    Hypertension Mother     Heart Disease Mother     Hypertension Father     Heart Attack Neg Hx     Stroke Neg Hx     Heart Surgery Neg Hx        Social History:  Social History     Tobacco Use    Smoking status: Never    Smokeless tobacco: Never   Substance Use Topics    Alcohol use: No    Drug use: No       Allergies:  No Known Allergies      Review of Systems       Review of Systems   Constitutional:  Negative for activity change, chills, diaphoresis, fatigue and fever.    Respiratory:  Negative for cough, chest tightness, shortness of breath, wheezing and stridor. Cardiovascular:  Positive for chest pain. Negative for palpitations. Gastrointestinal:  Negative for abdominal distention, abdominal pain, constipation, diarrhea, nausea and vomiting. Genitourinary:  Negative for difficulty urinating, dysuria and hematuria. Musculoskeletal:  Negative for back pain, joint swelling and myalgias. Skin:  Negative for rash. Neurological:  Negative for dizziness, weakness and headaches. Psychiatric/Behavioral:  Negative for agitation. The patient is not nervous/anxious. Physical Exam   Visit Vitals  BP (!) 180/84 (BP 1 Location: Left upper arm, BP Patient Position: At rest)   Pulse 67   Temp 98.3 °F (36.8 °C)   Resp 18   Ht 6' 2\" (1.88 m)   Wt 99.8 kg (220 lb)   SpO2 100%   BMI 28.25 kg/m²         Physical Exam  Constitutional:       General: He is not in acute distress. Appearance: He is not toxic-appearing. HENT:      Head: Normocephalic and atraumatic. Mouth/Throat:      Mouth: Mucous membranes are moist.   Cardiovascular:      Rate and Rhythm: Normal rate and regular rhythm. Heart sounds: Normal heart sounds. No murmur heard. No friction rub. No gallop. Pulmonary:      Effort: Pulmonary effort is normal.      Breath sounds: Normal breath sounds. Abdominal:      General: There is no distension. Palpations: Abdomen is soft. There is no mass. Tenderness: There is no abdominal tenderness. There is no guarding. Hernia: No hernia is present. Musculoskeletal:         General: No swelling, tenderness or deformity. Cervical back: Normal range of motion and neck supple. Skin:     General: Skin is warm and dry. Findings: No rash. Neurological:      General: No focal deficit present. Mental Status: He is alert and oriented to person, place, and time.    Psychiatric:         Mood and Affect: Mood normal.         Diagnostic Study Results     Labs -  Recent Results (from the past 12 hour(s))   EKG, 12 LEAD, INITIAL    Collection Time: 01/04/23  1:47 AM   Result Value Ref Range    Ventricular Rate 69 BPM    Atrial Rate 69 BPM    P-R Interval 148 ms    QRS Duration 88 ms    Q-T Interval 418 ms    QTC Calculation (Bezet) 447 ms    Calculated P Axis 32 degrees    Calculated R Axis -32 degrees    Calculated T Axis 26 degrees    Diagnosis       Normal sinus rhythm  Left axis deviation  Abnormal ECG  When compared with ECG of 04-OCT-2016 13:20,  No significant change was found     CBC WITH AUTOMATED DIFF    Collection Time: 01/04/23  1:55 AM   Result Value Ref Range    WBC 6.8 4.6 - 13.2 K/uL    RBC 4.66 4.35 - 5.65 M/uL    HGB 13.1 13.0 - 16.0 g/dL    HCT 39.2 36.0 - 48.0 %    MCV 84.1 78.0 - 100.0 FL    MCH 28.1 24.0 - 34.0 PG    MCHC 33.4 31.0 - 37.0 g/dL    RDW 13.3 11.6 - 14.5 %    PLATELET 646 450 - 717 K/uL    MPV 9.7 9.2 - 11.8 FL    NRBC 0.0 0  WBC    ABSOLUTE NRBC 0.00 0.00 - 0.01 K/uL    NEUTROPHILS 47 40 - 73 %    LYMPHOCYTES 37 21 - 52 %    MONOCYTES 13 (H) 3 - 10 %    EOSINOPHILS 2 0 - 5 %    BASOPHILS 1 0 - 2 %    IMMATURE GRANULOCYTES 1 (H) 0.0 - 0.5 %    ABS. NEUTROPHILS 3.2 1.8 - 8.0 K/UL    ABS. LYMPHOCYTES 2.5 0.9 - 3.6 K/UL    ABS. MONOCYTES 0.9 0.05 - 1.2 K/UL    ABS. EOSINOPHILS 0.1 0.0 - 0.4 K/UL    ABS. BASOPHILS 0.1 0.0 - 0.1 K/UL    ABS. IMM.  GRANS. 0.0 0.00 - 0.04 K/UL    DF AUTOMATED     METABOLIC PANEL, BASIC    Collection Time: 01/04/23  1:55 AM   Result Value Ref Range    Sodium 144 136 - 145 mmol/L    Potassium 3.5 3.5 - 5.5 mmol/L    Chloride 109 100 - 111 mmol/L    CO2 30 21 - 32 mmol/L    Anion gap 5 3.0 - 18 mmol/L    Glucose 97 74 - 99 mg/dL    BUN 10 7.0 - 18 MG/DL    Creatinine 0.77 0.6 - 1.3 MG/DL    BUN/Creatinine ratio 13 12 - 20      eGFR >60 >60 ml/min/1.73m2    Calcium 8.4 (L) 8.5 - 10.1 MG/DL   TROPONIN-HIGH SENSITIVITY    Collection Time: 01/04/23  1:55 AM   Result Value Ref Range    Troponin-High Sensitivity 9 0 - 78 ng/L Radiologic Studies -   XR CHEST PORT    (Results Pending)         Medical Decision Making   I am the first provider for this patient. I reviewed the vital signs, available nursing notes, past medical history, past surgical history, family history and social history. Vital Signs-Reviewed the patient's vital signs. EKG: normal sinus rhythm. No STEMI as interpreted by me    Records Reviewed: Nursing Notes (Time of Review: 2:36 AM)    ED Course: Progress Notes, Reevaluation, and Consults:         Provider Notes (Medical Decision Making):   MDM  Number of Diagnoses or Management Options  Chest pain, unspecified type  Diagnosis management comments: Patient is well-appearing on exam.  Vital signs stable. Overall unremarkable auscultation and physical exam.  Chest x-ray obtained is negative for any acute cardiopulmonary process as interpreted by me. Patient's troponin is negative. Screening lab work is grossly unremarkable. EKG showing normal sinus rhythm. Patient will be discharged home at this time to follow-up with his PCP for further cardiac testing. Return precautions advised. Patient verbalizes understanding and agreement with plan. Stable discharge                 Procedures    Critical Care Time:       Diagnosis     Clinical Impression:   1. Chest pain, unspecified type        Disposition: discharged    Follow-up Information       Follow up With Specialties Details [facility] [place] [id number] EMERGENCY DEPT Emergency Medicine  If symptoms worsen, As needed [id number]    Myranda Hardin MD Family Medicine Call in 1 day  [address] [place] [address]  [phone]               Patient's Medications   Start Taking    No medications on file   Continue Taking    DILTIAZEM ER (CARDIZEM CD) 240 MG CAPSULE    Take 1 Cap by mouth daily. LISINOPRIL (PRINIVIL, ZESTRIL) 20 MG TABLET    Take 1 Tab by mouth daily.    These Medications have changed    No medications on file   Stop Taking    No medications on file     Disclaimer: Sections of this note are dictated using utilizing voice recognition software. Minor typographical errors may be present. If questions arise, please do not hesitate to contact me or call our department.

## 2023-01-05 ENCOUNTER — OFFICE VISIT (OUTPATIENT)
Dept: CARDIOLOGY CLINIC | Age: 58
End: 2023-01-05
Payer: COMMERCIAL

## 2023-01-05 VITALS
HEART RATE: 67 BPM | DIASTOLIC BLOOD PRESSURE: 89 MMHG | HEIGHT: 74 IN | WEIGHT: 230 LBS | OXYGEN SATURATION: 99 % | BODY MASS INDEX: 29.52 KG/M2 | SYSTOLIC BLOOD PRESSURE: 167 MMHG

## 2023-01-05 DIAGNOSIS — Z98.890 S/P ABLATION OF ATRIAL FIBRILLATION: ICD-10-CM

## 2023-01-05 DIAGNOSIS — R07.9 CHEST PAIN, UNSPECIFIED TYPE: Primary | ICD-10-CM

## 2023-01-05 DIAGNOSIS — I10 ESSENTIAL HYPERTENSION WITH GOAL BLOOD PRESSURE LESS THAN 130/85: ICD-10-CM

## 2023-01-05 DIAGNOSIS — I48.0 PAROXYSMAL ATRIAL FIBRILLATION (HCC): ICD-10-CM

## 2023-01-05 DIAGNOSIS — Z86.79 S/P ABLATION OF ATRIAL FIBRILLATION: ICD-10-CM

## 2023-01-05 RX ORDER — LISINOPRIL 20 MG/1
20 TABLET ORAL DAILY
Qty: 90 TABLET | Refills: 1 | Status: SHIPPED | OUTPATIENT
Start: 2023-01-05

## 2023-01-05 NOTE — PROGRESS NOTES
HISTORY OF PRESENT ILLNESS  Pretty Pillai is a 61 y.o. male. Follow-up of hypertension, atrial fibrillation, status post ablation, obesity    9/2020 Patient c/o exertional chest pain with palpitations. This has been happening over the last 2-3 weeks, episodes last 5-10 seconds and resolve. He stopped taking Cardizem and Eliquis years ago. He was seen by PCP on Monday and was given Rx for Amlodipine 5 mg, but has not started it yet. He denies SOB, edema, or syncopal episodes. 1/23 seen after a little over 2 years with chest discomfort. He discontinued his blood pressure medicines a couple years ago but says that his pressure has been normal in PCPs office    Palpitations   The history is provided by the Patient and medical records (Status post A. fib ablation 2016). This is a recurrent problem. The current episode started more than 1 week ago. The problem has not changed since onset. The problem occurs every several days. On average, each episode lasts 3 seconds. The problem is associated with exercise. Associated symptoms include chest pain, dizziness and shortness of breath. Pertinent negatives include no fever, no malaise/fatigue, no claudication, no orthopnea, no PND, no nausea, no vomiting, no headaches and no cough. His past medical history is significant for hypertension. Chest Pain (Angina)   The history is provided by the Patient. This is a recurrent problem. The current episode started more than 1 week ago (12/22). The problem has been rapidly improving. Duration of episode(s) is 5 seconds. The problem occurs daily (4-5/ay). The pain is associated with coughing and exertion. The pain is present in the lateral region and left side. The quality of the pain is described as stabbing. Associated symptoms include dizziness, palpitations and shortness of breath.  Pertinent negatives include no claudication, no cough, no fever, no headaches, no malaise/fatigue, no nausea, no orthopnea, no PND and no vomiting. He has tried nothing for the symptoms. Follow-up  Associated symptoms include chest pain and shortness of breath. Pertinent negatives include no headaches. Shortness of Breath  The history is provided by the Patient. This is a new problem. The average episode lasts 10 seconds. The problem occurs intermittently. The current episode started more than 1 week ago (7/22). Associated symptoms include chest pain. Pertinent negatives include no fever, no headaches, no cough, no wheezing, no PND, no orthopnea, no vomiting, no rash, no leg swelling and no claudication. The problem's precipitants include exercise (walking at work from one to other trailer;). Review of Systems   Constitutional:  Negative for chills, fever, malaise/fatigue and weight loss. HENT:  Negative for congestion and nosebleeds. Eyes:  Negative for double vision, discharge and redness. Respiratory:  Positive for shortness of breath. Negative for cough and wheezing. Cardiovascular:  Positive for chest pain and palpitations. Negative for orthopnea, claudication, leg swelling and PND. Gastrointestinal:  Negative for diarrhea, nausea and vomiting. Genitourinary:  Negative for dysuria and hematuria. Musculoskeletal:  Negative for falls and joint pain. Skin:  Negative for rash. Neurological:  Positive for dizziness. Negative for seizures, loss of consciousness and headaches. Endo/Heme/Allergies:  Negative for polydipsia. Does not bruise/bleed easily. Psychiatric/Behavioral:  Negative for depression and substance abuse. The patient does not have insomnia.     No Known Allergies    Past Medical History:   Diagnosis Date    Atrial fibrillation ([address] 3/9/2016    Essential hypertension with goal blood pressure less than 130/85 4/21/2016    Essential hypertension with goal blood pressure less than 130/85 4/21/2016    H/O rotator cuff tear     Hernia     Obesity (BMI 30.0-34.9) 3/9/2016       Family History   Problem Relation Age of Onset    Hypertension Mother     Heart Disease Mother     Hypertension Father     Heart Attack Neg Hx     Stroke Neg Hx     Heart Surgery Neg Hx        Social History     Tobacco Use    Smoking status: Never    Smokeless tobacco: Never   Substance Use Topics    Alcohol use: No    Drug use: No        No current outpatient medications on file. No current facility-administered medications for this visit. Past Surgical History:   Procedure Laterality Date    HX HERNIA REPAIR      HX ORTHOPAEDIC      rotator cuff       Visit Vitals  BP (!) 167/89 (BP 1 Location: Left upper arm, BP Patient Position: Sitting, BP Cuff Size: Adult)   Pulse 67   Ht 6' 2\" (1.88 m)   Wt 104.3 kg (230 lb)   SpO2 99%   BMI 29.53 kg/m²     Vitals:    01/05/23 0946 01/05/23 0953   BP: (!) 177/94 (!) 167/89   BP 1 Location: Left upper arm Left upper arm   BP Patient Position: Sitting Sitting   BP Cuff Size: Adult Adult   Pulse: 68 67   Height: 6' 2\" (1.88 m)    Weight: 104.3 kg (230 lb)    SpO2: 98% 99%         Diagnostic Studies:  I have reviewed the relevant tests done on the patient and show as follows  EKG tracings reviewed by me today. Mr. Mitchell Donato has a reminder for a \"due or due soon\" health maintenance. I have asked that he contact his primary care provider for follow-up on this health maintenance. Physical Exam  Vitals and nursing note reviewed. Constitutional:       Appearance: He is well-developed. Neck:      Vascular: No JVD. Cardiovascular:      Rate and Rhythm: Normal rate and regular rhythm. Pulses: Intact distal pulses. Heart sounds: Normal heart sounds. No murmur heard. No friction rub. No gallop. Pulmonary:      Effort: Pulmonary effort is normal. No respiratory distress. Breath sounds: Normal breath sounds. No wheezing or rales. Chest:      Chest wall: No tenderness. Musculoskeletal:         General: Normal range of motion.       Cervical back: Normal range of motion and neck supple. Right lower leg: No edema. Left lower leg: No edema. Skin:     General: Skin is warm and dry. Neurological:      Mental Status: He is alert and oriented to person, place, and time. Atrial Fibrillation CHADSVASC2 Score Stroke Risk:   61 y.o. <65        + 0    male Male     [de-identified]   CHF HX: No    + 0   HTN HX: Yes    +1   Stroke/TIA/Thromboembolism No    +0   Vascular Disease HX: No    + 0   Diabetes Mellitus No    + 0   CHADSVASC 2 Score 1      Annual Stroke Risk 0.6% - low-moderate risk        ASSESSMENT and PLAN    9/2020 - EKG SR  Patient was last seen in 2016. He has h/o PAF s/p cryo ablation. He stopped taking all medications several years ago, because he felt they were no longer needed. He c/o chest pain with palpitations that began 2-3 weeks ago. Episodes occur with exertion, last 5-10 seconds and resolve with rest. He denies SOB, edema or syncope. Will begin Diltiazem 120 mg. Advised to not start Amlodipine. Follow home b/p. Event monitor x 30 days to evaluate for PAF. Marilyn Whitmore is 1 for HTN. Will monitor. 9/20 seen again due to dizziness. Also complaining of chest discomfort and shortness of breath. Symptoms are very atypical and likely secondary to uncontrolled hypertension. No orthostatic changes. Increase diltiazem and add lisinopril. Follow home chart. Will consider a stress test as blood pressure is controlled. No orthostatic changes and event monitor has not shown any significant arrhythmias so far. Excuse from work was given for 2 weeks due to uncontrolled hypertension. 9/20 overall feeling some better. Blood pressure is reducing slowly. Increase lisinopril to better control the blood pressure. Follow home chart. Okay to join work for now. Getting mobile telemetry which will be followed next visit. Diet weight and exercise discussed. Diagnoses and all orders for this visit:    1. Chest pain, unspecified type  -     ECHO STRESS; Future    2. Paroxysmal atrial fibrillation (HCC)  -     AMB POC EKG ROUTINE W/ 12 LEADS, INTER & REP    3. Essential hypertension with goal blood pressure less than 130/85  -     AMB POC EKG ROUTINE W/ 12 LEADS, INTER & REP  -     lisinopriL (PRINIVIL, ZESTRIL) 20 mg tablet; Take 1 Tablet by mouth daily. 4. S/P ablation of atrial fibrillation        Pertinent laboratory and test data reviewed and discussed with patient. See patient instructions also for other medical advice given    Medications Discontinued During This Encounter   Medication Reason    lisinopriL (PRINIVIL, ZESTRIL) 20 mg tablet Not A Current Medication    dilTIAZem ER (CARDIZEM CD) 240 mg capsule DISCONTINUED BY ANOTHER CLINICIAN       Follow-up and Dispositions    Return in about 3 months (around 4/5/2023), or if symptoms worsen or fail to improve, for post test, BP log x 4-5 days post med changes. 1/5/2023 chest pain is atypical but get a stress echo. Blood pressure is uncontrolled. Restart lisinopril and follow the home chart  Is staying in sinus rhythm with history of old A. fib status post ablation in 2016. Healthy diet discussed and Mediterranean diet guidelines given.

## 2023-01-05 NOTE — PATIENT INSTRUCTIONS
Medications Discontinued During This Encounter   Medication Reason    lisinopriL (PRINIVIL, ZESTRIL) 20 mg tablet Not A Current Medication    dilTIAZem ER (CARDIZEM CD) 240 mg capsule DISCONTINUED BY ANOTHER CLINICIAN         Learning About the Mediterranean Diet  What is the Mediterranean diet? The Mediterranean diet is a style of eating rather than a diet plan. It features foods eaten in [place], [place], [place] and [place], and other countries along the [facility]. It emphasizes eating foods like fish, fruits, vegetables, beans, high-fiber breads and whole grains, nuts, and olive oil. This style of eating includes limited red meat, cheese, and sweets. Why choose the Mediterranean diet? A Mediterranean-style diet may improve heart health. It contains more fat than other heart-healthy diets. But the fats are mainly from nuts, unsaturated oils (such as fish oils and olive oil), and certain nut or seed oils (such as canola, soybean, or flaxseed oil). These fats may help protect the heart and blood vessels. How can you get started on the Mediterranean diet? Here are some things you can do to switch to a more Mediterranean way of eating. What to eat  Eat a variety of fruits and vegetables each day, such as grapes, blueberries, tomatoes, broccoli, peppers, figs, olives, spinach, eggplant, beans, lentils, and chickpeas. Eat a variety of whole-grain foods each day, such as oats, brown rice, and whole wheat bread, pasta, and couscous. Eat fish at least 2 times a week. Try tuna, salmon, mackerel, lake trout, herring, or sardines. Eat moderate amounts of low-fat dairy products, such as milk, cheese, or yogurt. Eat moderate amounts of poultry and eggs. Choose healthy (unsaturated) fats, such as nuts, olive oil, and certain nut or seed oils like canola, soybean, and flaxseed. Limit unhealthy (saturated) fats, such as butter, palm oil, and coconut oil.  And limit fats found in animal products, such as meat and dairy products made with whole milk. Try to eat red meat only a few times a month in very small amounts. Limit sweets and desserts to only a few times a week. This includes sugar-sweetened drinks like soda. The Mediterranean diet may also include red wine with your meal--1 glass each day for women and up to 2 glasses a day for men. Tips for eating at home  Use herbs, spices, garlic, lemon zest, and citrus juice instead of salt to add flavor to foods. Add avocado slices to your sandwich instead of camp. Have fish for lunch or dinner instead of red meat. Brush the fish with olive oil, and broil or grill it. Sprinkle your salad with seeds or nuts instead of cheese. Cook with olive or canola oil instead of butter or oils that are high in saturated fat. Switch from 2% milk or whole milk to 1% or fat-free milk. Dip raw vegetables in a vinaigrette dressing or hummus instead of dips made from mayonnaise or sour cream.  Have a piece of fruit for dessert instead of a piece of cake. Try baked apples, or have some dried fruit. Tips for eating out  Try broiled, grilled, baked, or poached fish instead of having it fried or breaded. Ask your  to have your meals prepared with olive oil instead of butter. Order dishes made with marinara sauce or sauces made from olive oil. Avoid sauces made from cream or mayonnaise. Choose whole-grain breads, whole wheat pasta and pizza crust, brown rice, beans, and lentils. Cut back on butter or margarine on bread. Instead, you can dip your bread in a small amount of olive oil. Ask for a side salad or grilled vegetables instead of french fries or chips. Where can you learn more? Go to http://ranjeet-ha.info/  Enter O407 in the search box to learn more about \"Learning About the Mediterranean Diet. \"  Current as of: May 9, 2022               Content Version: 13.4  © 4559-9562 [employer], [employer].    Care instructions adapted under license by [employer] (which disclaims liability or warranty for this information). If you have questions about a medical condition or this instruction, always ask your healthcare professional. [address] any warranty or liability for your use of this information.

## 2023-01-05 NOTE — PROGRESS NOTES
1. Have you been to the ER, urgent care clinic since your last visit? Hospitalized since your last visit? Yes, [facility]     2. Have you seen or consulted any other health care providers outside of the [address] [place] [address] since your last visit? Include any pap smears or colon screening. Yes, PCP    3. Since your last visit, have you had any of the following symptoms? chest pains, palpitations, shortness of breath, and dizziness. 4.  Have you had any blood work, X-rays or cardiac testing? No     Requested: NO     In [facility]: NO    5. Where do you normally have your labs drawn? [facility]    6. Do you need any refills today?    No

## 2023-02-15 ENCOUNTER — TELEPHONE (OUTPATIENT)
Age: 58
End: 2023-02-15

## 2023-02-15 DIAGNOSIS — R07.9 CHEST PAIN, UNSPECIFIED: Primary | ICD-10-CM
